# Patient Record
Sex: FEMALE
[De-identification: names, ages, dates, MRNs, and addresses within clinical notes are randomized per-mention and may not be internally consistent; named-entity substitution may affect disease eponyms.]

---

## 2017-01-01 ENCOUNTER — TRANSCRIPTION ENCOUNTER (OUTPATIENT)
Age: 37
End: 2017-01-01

## 2017-02-18 ENCOUNTER — TRANSCRIPTION ENCOUNTER (OUTPATIENT)
Age: 37
End: 2017-02-18

## 2017-03-31 ENCOUNTER — TRANSCRIPTION ENCOUNTER (OUTPATIENT)
Age: 37
End: 2017-03-31

## 2017-07-10 ENCOUNTER — TRANSCRIPTION ENCOUNTER (OUTPATIENT)
Age: 37
End: 2017-07-10

## 2017-07-12 ENCOUNTER — TRANSCRIPTION ENCOUNTER (OUTPATIENT)
Age: 37
End: 2017-07-12

## 2018-02-05 ENCOUNTER — TRANSCRIPTION ENCOUNTER (OUTPATIENT)
Age: 38
End: 2018-02-05

## 2018-07-17 ENCOUNTER — TRANSCRIPTION ENCOUNTER (OUTPATIENT)
Age: 38
End: 2018-07-17

## 2019-11-08 ENCOUNTER — TRANSCRIPTION ENCOUNTER (OUTPATIENT)
Age: 39
End: 2019-11-08

## 2020-04-28 ENCOUNTER — TRANSCRIPTION ENCOUNTER (OUTPATIENT)
Age: 40
End: 2020-04-28

## 2020-11-30 ENCOUNTER — TRANSCRIPTION ENCOUNTER (OUTPATIENT)
Age: 40
End: 2020-11-30

## 2021-05-18 PROBLEM — Z00.00 ENCOUNTER FOR PREVENTIVE HEALTH EXAMINATION: Status: ACTIVE | Noted: 2021-05-18

## 2021-05-20 ENCOUNTER — APPOINTMENT (OUTPATIENT)
Dept: ORTHOPEDIC SURGERY | Facility: CLINIC | Age: 41
End: 2021-05-20
Payer: MEDICAID

## 2021-05-20 VITALS
HEIGHT: 65 IN | DIASTOLIC BLOOD PRESSURE: 77 MMHG | WEIGHT: 161 LBS | BODY MASS INDEX: 26.82 KG/M2 | HEART RATE: 64 BPM | SYSTOLIC BLOOD PRESSURE: 121 MMHG

## 2021-05-20 PROCEDURE — 99204 OFFICE O/P NEW MOD 45 MIN: CPT

## 2021-05-20 PROCEDURE — 99072 ADDL SUPL MATRL&STAF TM PHE: CPT

## 2021-05-20 PROCEDURE — 73502 X-RAY EXAM HIP UNI 2-3 VIEWS: CPT | Mod: LT

## 2021-05-28 ENCOUNTER — APPOINTMENT (OUTPATIENT)
Dept: ORTHOPEDIC SURGERY | Facility: CLINIC | Age: 41
End: 2021-05-28
Payer: MEDICAID

## 2021-05-28 PROCEDURE — 99072 ADDL SUPL MATRL&STAF TM PHE: CPT

## 2021-05-28 PROCEDURE — 99203 OFFICE O/P NEW LOW 30 MIN: CPT | Mod: 25

## 2021-05-28 PROCEDURE — 20611 DRAIN/INJ JOINT/BURSA W/US: CPT | Mod: LT

## 2021-05-28 NOTE — PROCEDURE
[de-identified] : Ultrasound-Guided Diagnostic Injection: Left Hip Intra-articular Injection.\par \par Indication for U/S Guidance: Ensure placement within the femoroacetabular joint for diagnostic purposes, while avoiding anterior neurovascular structures\par \par Indication for Injection: Labral Tear\par \par A discussion was had with the patient regarding this procedure and all questions were answered. All risks, benefits and alternatives were discussed. These included but were not limited to bleeding, infection, and allergic reaction.  A timeout was done to ensure correct side and pt agreed to the procedure.   Betadine was used to sterilize and prep the area, and alcohol was used to clean the skin in the anterior aspect of the hip joint. The femoroacetabular joint was visualized utilizing the [employer] Dialte II Edge, the Curvilinear 30cm 5-2 MHz transducer, sterile probe cover and sterile ultrasound gel.  The joint was visualized in the longitudinal axis and an in-plane approach was used for the injection.  Ultrasound guidance was utilized to ensure accuracy of the intra-articular injection, and avoid the femoral neurovascular structures.  A 22-gauge 3" needle was used to inject 4cc of 1% lidocaine without epinephrine into the femoroacetabular joint. An image confirming the correct location of the needle placement and infusion of the steroid at the end of the injection was saved.  A sterile bandage was then applied. The patient tolerated the procedure well and there were no complications. \par \par \par \par

## 2021-05-28 NOTE — HISTORY OF PRESENT ILLNESS
[de-identified] : Patient is here for left hip pain \par \par The patient's past medical history, past surgical history, medications and allergies were reviewed by me today and documented accordingly. In addition, the patient's family and social history, which were noncontributory to this visit, were reviewed also. Intake form was reviewed. The patient has no family history of arthritis.

## 2021-05-28 NOTE — PHYSICAL EXAM
[de-identified] : Constitutional: Well-nourished, well-developed, No acute distress\par Respiratory:  Good respiratory effort, no SOB\par Psychiatric: Pleasant and normal affect, alert and oriented x3\par Skin: Clean dry and intact B/L LE\par Musculoskeletal: normal except where as noted in regional exam\par \par \par Left Hip:\par \par APPEARANCE: no marked deformities, no swelling or malalignment\par POSITIVE TENDERNESS: Hip flexor region, sartorius, proximal rectus femoris\par NONTENDER: greater trochanter, TFL, gluteal region, ischium/proximal hamstring region, and pubic symphysis. \par ROM: Limited in all planes due to pain. \par RESISTIVE TESTING: MMT 4+/5 and mild pain with hip flexion, painless resisted ER/IR/SLR/abduction/adduction. \par SPECIAL TESTS: + FADIR, neg GEOVANY, mild pain with loaded flexion & scouring. neg fulcrum test\par

## 2021-05-28 NOTE — DISCUSSION/SUMMARY
[de-identified] : Discussed findings of today's exam and possible causes of patient's pain.  Educated patient on their most probable diagnosis of Left hip labral tear and proximal hamstring tendinosis.  Reviewed possible courses of treatment, and we collaboratively decided best course of treatment at this time will include diagnostic ultrasound guided lidocaine only injection today to rule in/out labral pathology as etiology of her pain (see procedure note).  Patient advised to make note of whether their pain is improved starting in the next few minutes until 4-6 hours while the lidocaine numbs the interior of the hip joint; this is the diagnostic part of the injection. If pain is relieved immediately that helps us determine that the etiology of the pain is coming from within the hip joint.  Re-examination of the involved hip after the injection revealed noted improvement.  The patient should follow up with Dr. Moore in 2 weeks for further management.  If diagnostic injection rules out labral pathology and patient continues to have proximal hamstring pain from chronic tendinosis I advised her that there is a potential for consideration of PRP injection into the proximal hamstring area.  Patient appreciates and agrees with current plan.\par \par This note was generated using dragon medical dictation software.  A reasonable effort has been made for proofreading its contents, but typos may still remain.  If there are any questions or points of clarification needed please notify my office.\par

## 2021-06-17 ENCOUNTER — APPOINTMENT (OUTPATIENT)
Dept: ORTHOPEDIC SURGERY | Facility: CLINIC | Age: 41
End: 2021-06-17
Payer: MEDICAID

## 2021-06-17 ENCOUNTER — TRANSCRIPTION ENCOUNTER (OUTPATIENT)
Age: 41
End: 2021-06-17

## 2021-06-17 DIAGNOSIS — G89.29 PAIN IN LEFT HIP: ICD-10-CM

## 2021-06-17 DIAGNOSIS — M25.552 PAIN IN LEFT HIP: ICD-10-CM

## 2021-06-17 PROCEDURE — 99214 OFFICE O/P EST MOD 30 MIN: CPT

## 2021-06-17 PROCEDURE — 99072 ADDL SUPL MATRL&STAF TM PHE: CPT

## 2021-06-18 ENCOUNTER — NON-APPOINTMENT (OUTPATIENT)
Age: 41
End: 2021-06-18

## 2021-06-18 PROBLEM — M25.552 CHRONIC LEFT HIP PAIN: Status: ACTIVE | Noted: 2021-05-20

## 2021-06-24 ENCOUNTER — APPOINTMENT (OUTPATIENT)
Dept: ORTHOPEDIC SURGERY | Facility: CLINIC | Age: 41
End: 2021-06-24

## 2021-07-07 ENCOUNTER — TRANSCRIPTION ENCOUNTER (OUTPATIENT)
Age: 41
End: 2021-07-07

## 2021-07-15 ENCOUNTER — TRANSCRIPTION ENCOUNTER (OUTPATIENT)
Age: 41
End: 2021-07-15

## 2021-08-18 ENCOUNTER — TRANSCRIPTION ENCOUNTER (OUTPATIENT)
Age: 41
End: 2021-08-18

## 2021-08-24 ENCOUNTER — APPOINTMENT (OUTPATIENT)
Dept: ORTHOPEDIC SURGERY | Facility: CLINIC | Age: 41
End: 2021-08-24
Payer: MEDICAID

## 2021-08-24 DIAGNOSIS — S76.312D STRAIN OF MUSCLE, FASCIA AND TENDON OF THE POSTERIOR MUSCLE GROUP AT THIGH LEVEL, LEFT THIGH, SUBSEQUENT ENCOUNTER: ICD-10-CM

## 2021-08-24 PROCEDURE — 004KIT: CUSTOM

## 2021-08-24 PROCEDURE — 0232T NJX PLATELET PLASMA: CPT

## 2021-08-24 NOTE — DISCUSSION/SUMMARY
[de-identified] : Patient was seen today for continued management of chronic left proximal hamstring tendinopathy and was treated with a Leukocyte-Rich PRP injection (see procedure note).  Patient has been advised that thee can be post-procedural pain as we are trying to recreate a healing response.  Patient has been educated that NSAID medications have anti-platelet effect and that they would hinder the ability of the PRP to produce the wanted to results.  Patient has been advised they must AVOID NSAIDs and may apply ice locally or take Tylenol as needed for pain.  Patient is also advised to rest the area for at least the next 48 hours, this means avoidance of any heavy lifting or excessive physical activity of the involved area.  We had a lengthy discussion regarding appropriate activity modification with her weight lifting program over the next 2 weeks.  Patient is advised that if these measures do not provide sufficient pain relief they should call my office at [phone] and we can discuss short-term use of narcotic pain medications for pain relief if needed.  Patient advised it can take up to 1-2 months for the PRP to reach full effect and that it is not guaranteed to provide significant relief and in some rare cases will not provide any relief.  Patient is advised to limit any heavy or repetitive activity with the involved area for at least the next 48 hours and to limit usage with ADL's.  Patient should avoid any gym exercise or physical therapy for the first 2 weeks after today's injection.  Patient has been given a post-procedure rehabilitation protocol which is to be taken to and followed by physical therapy which can be started after 2 weeks.  Patient should follow up in 1-2 months for re-assessment.  Patient appreciates and agrees with current plan.\par \par This note was generated using dragon medical dictation software.  A reasonable effort has been made for proofreading its contents, but typos may still remain.  If there are any questions or points of clarification needed please notify my office.

## 2021-08-24 NOTE — HISTORY OF PRESENT ILLNESS
[de-identified] : Patient has a history of left hamstring tendinopathy.  We discussed treatment options and patient would like to proceed with PRP injection at this time.  No significant interval change in pain since last evaluated.

## 2021-08-24 NOTE — PROCEDURE
[de-identified] : After reviewing risks/benefits/alternatives patient elected to proceed with a peripheral blood draw in order to obtain a platelet rich plasma sample to be injected autologously back to the patient. \par The patient was placed in a position of comfort, and utilizing clean technique a peripheral venous blood sample was obtained from a vein in the antecubital fossa. A 20-gauge needle was utilized to withdraw 60cc of blood from the patient which was collected into a sterile syringe. The peripheral blood sample was immediately transferred via sterile technique to the [employer]iant closed-loop collection system.  The [employer].ge centrifuge preparation system was set to run without interruption and a 4 cc sample of LR-PRP was collected into a pre-attached sterile syringe.\par \par Ultrasound Guided Injection \par Indication for ultrasound guidance: Ensure placement of PRP within the involved area of the proximal hamstring tendon\par Utlizing the Nitro PDF II [employer] portable ultrasound machine, the Curvilinear 30cm 5-2 MHz transducer, sterile probe cover and sterile ultrasound gel, ultrasound guidance with the probe in the longitudinal axis, utilizing an in-plane approach, was used for the following injection:\par \par Injection: Left Proximal Hamstring\par Indication: Proximal Hamstring tendinopathy.\par \par A discussion was had with the patient regarding this procedure and all questions were answered. All risks, benefits and alternatives were discussed. These included but were not limited to bleeding, infection, allergic reaction and reaccumulation of fluid. A timeout was done to ensure correct side and pt agreed to the procedure.  Betadine was used to sterilize and alcohol was used to clean the skin and prep the area in the posterior aspect of the hip. Ethyl chloride spray was then used as a topical anesthetic. A 22-Gauge 3" needle was used to inject the prepared PRP sample into the involved area of tendinopathy. A sterile bandage was then applied. The patient tolerated the procedure well.\par \par PRP: 4cc\par PPP: 29cc\par RBC: 26cc

## 2021-08-24 NOTE — REASON FOR VISIT
[Initial Visit] : an initial visit for [Family Member] : family member [FreeTextEntry2] : left leg pain

## 2021-08-24 NOTE — PHYSICAL EXAM
[de-identified] : Constitutional: Well-nourished, well-developed, No acute distress\par Respiratory:  Good respiratory effort, no SOB\par Lymphatic: No regional lymphadenopathy, no lymphedema\par Psychiatric: Pleasant and normal affect, alert and oriented x3\par Musculoskeletal: normal except where as noted in regional exam\par \par Left hip:\par \par APPEARANCE: no marked deformities, no swelling or malalignment\par POSITIVE TENDERNESS: Mild at proximal hamstring and ischial tuberosity.\par NONTENDER: greater trochanter, TFL, gluteal region, hip flexor region, sartorius and pubic symphysis. \par ROM: Full lumbar flexion, mildly limited hip flexion, NL hip extension, all due to pain in the proximal hamstring region. \par RESISTIVE TESTING: + pain in proximal hamstring region with resisted hip extension, moreso with the knee in flexion than with the knee in extension. painless resisted ER/IR/SLR/abduction/adduction. \par SPECIAL TESTS: neg GEOVANY/FADIR\par

## 2021-09-07 ENCOUNTER — TRANSCRIPTION ENCOUNTER (OUTPATIENT)
Age: 41
End: 2021-09-07

## 2021-09-10 ENCOUNTER — TRANSCRIPTION ENCOUNTER (OUTPATIENT)
Age: 41
End: 2021-09-10

## 2021-10-22 ENCOUNTER — TRANSCRIPTION ENCOUNTER (OUTPATIENT)
Age: 41
End: 2021-10-22

## 2024-10-04 ENCOUNTER — NON-APPOINTMENT (OUTPATIENT)
Age: 44
End: 2024-10-04